# Patient Record
Sex: MALE | Race: BLACK OR AFRICAN AMERICAN | NOT HISPANIC OR LATINO | Employment: OTHER | URBAN - METROPOLITAN AREA
[De-identification: names, ages, dates, MRNs, and addresses within clinical notes are randomized per-mention and may not be internally consistent; named-entity substitution may affect disease eponyms.]

---

## 2017-01-10 ENCOUNTER — TELEPHONE (OUTPATIENT)
Dept: PHYSICAL MEDICINE AND REHAB | Facility: CLINIC | Age: 37
End: 2017-01-10

## 2017-01-10 NOTE — TELEPHONE ENCOUNTER
Left the patient a message letting him know the importance of keeping his appointment tomorrow if he wanted to continue getting his medications and orders from Dr Lyles.. His last visit was 9/28//15.

## 2017-01-11 ENCOUNTER — OFFICE VISIT (OUTPATIENT)
Dept: PHYSICAL MEDICINE AND REHAB | Facility: CLINIC | Age: 37
End: 2017-01-11
Payer: MEDICAID

## 2017-01-11 VITALS
SYSTOLIC BLOOD PRESSURE: 104 MMHG | HEART RATE: 116 BPM | WEIGHT: 150 LBS | BODY MASS INDEX: 19.25 KG/M2 | HEIGHT: 74 IN | DIASTOLIC BLOOD PRESSURE: 71 MMHG

## 2017-01-11 DIAGNOSIS — R25.2 SPASTICITY: Primary | ICD-10-CM

## 2017-01-11 DIAGNOSIS — S24.109S: ICD-10-CM

## 2017-01-11 DIAGNOSIS — K59.2 NEUROGENIC BOWEL: ICD-10-CM

## 2017-01-11 PROBLEM — S24.109A THORACIC SPINAL CORD INJURY: Status: ACTIVE | Noted: 2017-01-11

## 2017-01-11 PROCEDURE — 99213 OFFICE O/P EST LOW 20 MIN: CPT | Mod: PBBFAC,PO | Performed by: PHYSICAL MEDICINE & REHABILITATION

## 2017-01-11 PROCEDURE — 99999 PR PBB SHADOW E&M-EST. PATIENT-LVL III: CPT | Mod: PBBFAC,,, | Performed by: PHYSICAL MEDICINE & REHABILITATION

## 2017-01-11 PROCEDURE — 99214 OFFICE O/P EST MOD 30 MIN: CPT | Mod: S$PBB,,, | Performed by: PHYSICAL MEDICINE & REHABILITATION

## 2017-01-11 RX ORDER — BACLOFEN 20 MG/1
20 TABLET ORAL 4 TIMES DAILY
Qty: 120 TABLET | Refills: 6 | Status: SHIPPED | OUTPATIENT
Start: 2017-01-11 | End: 2017-12-14 | Stop reason: SDUPTHER

## 2017-01-11 RX ORDER — HYDROCODONE BITARTRATE AND ACETAMINOPHEN 10; 325 MG/1; MG/1
1 TABLET ORAL DAILY PRN
Qty: 30 TABLET | Refills: 0 | Status: SHIPPED | OUTPATIENT
Start: 2017-01-11 | End: 2017-01-21

## 2017-01-11 NOTE — MR AVS SNAPSHOT
[facility]  [address]  [facility] [address]  Phone: [phone]                  Gregorio Gaytan   2017 10:00 AM   Office Visit    Description:  Male : 1980   Provider:  Ryan Lyles MD   Department:  [facility]           Diagnoses this Visit        Comments    Spasticity    -  Primary     Neurogenic bowel                To Do List           Goals (5 Years of Data)     None      Follow-Up and Disposition     Return in about 6 months (around 2017) for 40 mins.       These Medications        Disp Refills Start End    baclofen (LIORESAL) 20 MG tablet 120 tablet 6 2017     Take 1 tablet (20 mg total) by mouth 4 (four) times daily. - Oral    Pharmacy: [address] #: [phone]       hydrocodone-acetaminophen 10-325mg (NORCO)  mg Tab 30 tablet 0 2017    Take 1 tablet by mouth daily as needed. - Oral    Pharmacy: [address] #: [phone]         [facility] On [facility]     [facility] On Call Nurse Care Line -  Assistance  Registered nurses in the [facility] provide clinical advisement, health education, appointment booking, and other advisory services.  Call for this free service at [phone].             Medications           Message regarding Medications     Verify the changes and/or additions to your medication regime listed below are the same as discussed with your clinician today.  If any of these changes or additions are incorrect, please notify your healthcare provider.        CHANGE how you are taking these medications     Start Taking Instead of    baclofen (LIORESAL) 20 MG tablet baclofen (LIORESAL) 20 MG tablet    Dosage:  Take 1 tablet (20 mg total) by mouth 4 (four) times daily. Dosage:  Take 20 mg by mouth 4 (four) times daily.    Reason for  "Change:  Reorder            Verify that the below list of medications is an accurate representation of the medications you are currently taking.  If none reported, the list may be blank. If incorrect, please contact your healthcare provider. Carry this list with you in case of emergency.           Current Medications     baclofen (LIORESAL) 20 MG tablet Take 1 tablet (20 mg total) by mouth 4 (four) times daily.    diazePAM (VALIUM) 10 MG Tab TAKE ONE TABLET BY MOUTH THREE TIMES DAILY    food supplemt, lactose-reduced (ENSURE HIGH PROTEIN) Liqd Take 300 mLs by mouth 3 (three) times daily with meals.    gabapentin (NEURONTIN) 800 MG tablet TAKE ONE TABLET BY MOUTH THREE TIMES DAILY    hydrocodone-acetaminophen 10-325mg (NORCO)  mg Tab Take 1 tablet by mouth daily as needed.    scopolamine (TRANSDERM-SCOP) 1.5 mg Place 1 patch (1.5 mg total) onto the skin every 72 hours.    tramadol (ULTRAM) 50 mg tablet TAKE 1 OR 2 TABLETS BY MOUTH THREE TIMES DAILY AS NEEDED           Clinical Reference Information           Vital Signs - Last Recorded  Most recent update: 1/11/2017 11:38 AM by Lisset Sarmiento MA    BP Pulse Ht Wt BMI    104/71 (!) 116 6' 2" (1.88 m) 68 kg (150 lb) 19.26 kg/m2      Blood Pressure          Most Recent Value    BP  104/71      Allergies as of 1/11/2017     Norco [Hydrocodone-acetaminophen]      Immunizations Administered on Date of Encounter - 1/11/2017     None      Orders Placed During Today's Visit      Normal Orders This Visit    Ambulatory Referral to Neurosurgery       [facility] Sign-Up     Activating your [facility] account is as easy as 1-2-3!     1) Visit [email or web address], select Sign Up Now, enter this activation code and your date of birth, then select Next.  [email or web address]  Expires: 2/25/2017 12:21 PM      2) Create a username and password to use when you visit [facility] in the future and select a security question in case you lose your password and select Next.    3) Enter your e-mail " address and click Sign Up!    Additional Information  If you have questions, please e-mail [email or web address] or call [phone] to talk to our [facility] staff. Remember, [facility] is NOT to be used for urgent needs. For medical emergencies, dial 911.         Instructions    1.  Do pressure relief every 20 minutes (3 times per hour).  Your wounds will not heal unless you do this!  2.  Send in the application to the [facility] for bus service to pick you up for your appointments.  3.  I have referred you for a baclofen pump trial.  Please keep this appointment.    4.  Restart your Baclofen.  Start with only 1/2 pill 3 times per day for the first week then increase it to 1 whole pill 3 times per day.  Wait another week then increase it to 1 whole pill 4 times per day.

## 2017-01-11 NOTE — PATIENT INSTRUCTIONS
1.  Do pressure relief every 20 minutes (3 times per hour).  Your wounds will not heal unless you do this!  2.  Send in the application to the [facility] for bus service to pick you up for your appointments.  3.  I have referred you for a baclofen pump trial.  Please keep this appointment.    4.  Restart your Baclofen.  Start with only 1/2 pill 3 times per day for the first week then increase it to 1 whole pill 3 times per day.  Wait another week then increase it to 1 whole pill 4 times per day.
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
## 2017-01-13 ENCOUNTER — TELEPHONE (OUTPATIENT)
Dept: NEUROSURGERY | Facility: CLINIC | Age: 37
End: 2017-01-13

## 2017-01-13 NOTE — TELEPHONE ENCOUNTER
----- Message from Tamra Bradshaw sent at 1/11/2017 12:15 PM CST -----  Contact: lisset(ma for ) [phone]  Lisset is calling to speak with nurse regarding a appt.pls call

## 2017-03-09 ENCOUNTER — TELEPHONE (OUTPATIENT)
Dept: NEUROSURGERY | Facility: CLINIC | Age: 37
End: 2017-03-09

## 2017-03-09 NOTE — TELEPHONE ENCOUNTER
----- Message from Lisset Sarmiento MA sent at 3/6/2017  2:01 PM CST -----  Contact: Lisset/Dr Lyles/[phone]  I spoke to the patient and he is interested in rescheduling the baclofen trial. Said he did not know about the appt.

## 2017-03-24 RX ORDER — TRAMADOL HYDROCHLORIDE 50 MG/1
TABLET ORAL
Qty: 180 TABLET | Refills: 1 | Status: SHIPPED | OUTPATIENT
Start: 2017-03-24 | End: 2017-06-05 | Stop reason: SDUPTHER

## 2017-03-24 RX ORDER — DIAZEPAM 10 MG/1
TABLET ORAL
Qty: 90 TABLET | Refills: 1 | Status: SHIPPED | OUTPATIENT
Start: 2017-03-24 | End: 2017-06-05 | Stop reason: SDUPTHER

## 2017-05-01 RX ORDER — GABAPENTIN 800 MG/1
TABLET ORAL
Qty: 90 TABLET | Refills: 11 | Status: SHIPPED | OUTPATIENT
Start: 2017-05-01 | End: 2017-12-14 | Stop reason: SDUPTHER

## 2017-06-05 RX ORDER — TRAMADOL HYDROCHLORIDE 50 MG/1
TABLET ORAL
Qty: 180 TABLET | Refills: 4 | Status: SHIPPED | OUTPATIENT
Start: 2017-06-05 | End: 2017-12-14 | Stop reason: SDUPTHER

## 2017-06-05 RX ORDER — DIAZEPAM 10 MG/1
TABLET ORAL
Qty: 90 TABLET | Refills: 4 | Status: SHIPPED | OUTPATIENT
Start: 2017-06-05 | End: 2017-12-14 | Stop reason: SDUPTHER

## 2017-09-21 ENCOUNTER — TELEPHONE (OUTPATIENT)
Dept: PHYSICAL MEDICINE AND REHAB | Facility: CLINIC | Age: 37
End: 2017-09-21

## 2017-09-21 NOTE — TELEPHONE ENCOUNTER
----- Message from Waldo Parker sent at 9/21/2017 12:25 PM CDT -----  Contact: Patient @ [phone]  Patient is requesting a return call from Lisset about paperwork for his , dean call

## 2017-12-14 ENCOUNTER — OFFICE VISIT (OUTPATIENT)
Dept: INTERNAL MEDICINE | Facility: CLINIC | Age: 37
End: 2017-12-14
Attending: FAMILY MEDICINE
Payer: MEDICAID

## 2017-12-14 VITALS
HEART RATE: 89 BPM | TEMPERATURE: 98 F | SYSTOLIC BLOOD PRESSURE: 117 MMHG | DIASTOLIC BLOOD PRESSURE: 84 MMHG | OXYGEN SATURATION: 97 %

## 2017-12-14 DIAGNOSIS — S24.103A SPINAL CORD INJURY AT T7-T12 LEVEL: Primary | ICD-10-CM

## 2017-12-14 DIAGNOSIS — Z76.0 MEDICATION REFILL: ICD-10-CM

## 2017-12-14 PROCEDURE — 99999 PR PBB SHADOW E&M-EST. PATIENT-LVL IV: CPT | Mod: PBBFAC,,, | Performed by: STUDENT IN AN ORGANIZED HEALTH CARE EDUCATION/TRAINING PROGRAM

## 2017-12-14 PROCEDURE — 99214 OFFICE O/P EST MOD 30 MIN: CPT | Mod: PBBFAC,PO | Performed by: STUDENT IN AN ORGANIZED HEALTH CARE EDUCATION/TRAINING PROGRAM

## 2017-12-14 PROCEDURE — 99202 OFFICE O/P NEW SF 15 MIN: CPT | Mod: S$PBB,,, | Performed by: STUDENT IN AN ORGANIZED HEALTH CARE EDUCATION/TRAINING PROGRAM

## 2017-12-14 RX ORDER — DIAZEPAM 10 MG/1
10 TABLET ORAL 3 TIMES DAILY
Qty: 60 TABLET | Refills: 0 | Status: SHIPPED | OUTPATIENT
Start: 2017-12-14 | End: 2018-01-08 | Stop reason: SDUPTHER

## 2017-12-14 RX ORDER — BACLOFEN 20 MG/1
20 TABLET ORAL 4 TIMES DAILY
Qty: 60 TABLET | Refills: 0 | Status: SHIPPED | OUTPATIENT
Start: 2017-12-14

## 2017-12-14 RX ORDER — GABAPENTIN 800 MG/1
800 TABLET ORAL 3 TIMES DAILY
Qty: 90 TABLET | Refills: 0 | Status: SHIPPED | OUTPATIENT
Start: 2017-12-14

## 2017-12-14 RX ORDER — TRAMADOL HYDROCHLORIDE 50 MG/1
TABLET ORAL
Qty: 80 TABLET | Refills: 0 | Status: SHIPPED | OUTPATIENT
Start: 2017-12-14 | End: 2018-01-08 | Stop reason: SDUPTHER
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
## 2017-12-18 NOTE — PROGRESS NOTES
Case discussed with resident agree with assessment and plan. Discussed pain management/PM&R follow up, one time refill in clinic of valium/tramadol.

## 2018-01-08 ENCOUNTER — IMMUNIZATION (OUTPATIENT)
Dept: INTERNAL MEDICINE | Facility: CLINIC | Age: 38
End: 2018-01-08
Attending: FAMILY MEDICINE
Payer: MEDICAID

## 2018-01-08 VITALS
TEMPERATURE: 100 F | HEIGHT: 74 IN | HEART RATE: 125 BPM | SYSTOLIC BLOOD PRESSURE: 101 MMHG | OXYGEN SATURATION: 98 % | DIASTOLIC BLOOD PRESSURE: 74 MMHG

## 2018-01-08 DIAGNOSIS — Z76.0 MEDICATION REFILL: ICD-10-CM

## 2018-01-08 DIAGNOSIS — Z23 NEED FOR STREPTOCOCCUS PNEUMONIAE AND INFLUENZA VACCINATION: ICD-10-CM

## 2018-01-08 DIAGNOSIS — L89.40: ICD-10-CM

## 2018-01-08 DIAGNOSIS — S24.103A SPINAL CORD INJURY AT T7-T12 LEVEL: Primary | ICD-10-CM

## 2018-01-08 PROCEDURE — 90471 IMMUNIZATION ADMIN: CPT | Mod: PBBFAC,PO

## 2018-01-08 PROCEDURE — 99213 OFFICE O/P EST LOW 20 MIN: CPT | Mod: PBBFAC,PO,25 | Performed by: INTERNAL MEDICINE

## 2018-01-08 PROCEDURE — 99999 PR PBB SHADOW E&M-EST. PATIENT-LVL III: CPT | Mod: PBBFAC,,, | Performed by: INTERNAL MEDICINE

## 2018-01-08 PROCEDURE — 99214 OFFICE O/P EST MOD 30 MIN: CPT | Mod: S$PBB,,, | Performed by: INTERNAL MEDICINE

## 2018-01-08 RX ORDER — DIAZEPAM 10 MG/1
10 TABLET ORAL 3 TIMES DAILY
Qty: 90 TABLET | Refills: 2 | Status: SHIPPED | OUTPATIENT
Start: 2018-01-08 | End: 2018-04-19 | Stop reason: SDUPTHER

## 2018-01-08 RX ORDER — TRAMADOL HYDROCHLORIDE 50 MG/1
TABLET ORAL
Qty: 80 TABLET | Refills: 2 | Status: SHIPPED | OUTPATIENT
Start: 2018-01-08 | End: 2018-04-19 | Stop reason: SDUPTHER

## 2018-01-08 NOTE — PROGRESS NOTES
Subjective:       Patient ID: Gregorio Gaytan is a 37 y.o. male.    Chief Complaint: Follow-up    Here to [facility].    Dr. Lyles retired.    No new complaints, but has paralysis of spinal cord around T5-6. C/b sacral sores, hip sores, need for suprapubic tube. Lack of bowel control but does have way to promote self defecation.    On anti-spasm meds.    Needs to re-establish care with A.      Review of Systems   Constitutional: Negative for appetite change and unexpected weight change.   Respiratory: Negative for chest tightness and shortness of breath.    Cardiovascular: Negative for chest pain, palpitations and leg swelling.   Gastrointestinal: Negative for abdominal distention, abdominal pain, diarrhea, nausea and vomiting.   Genitourinary: Positive for difficulty urinating. Negative for scrotal swelling and testicular pain.   Musculoskeletal: Positive for gait problem and myalgias. Negative for back pain, neck pain and neck stiffness.   Skin: Positive for wound.        No lesions   Neurological: Positive for weakness. Negative for tremors and syncope.   Hematological: Negative for adenopathy. Does not bruise/bleed easily.       Objective:      Physical Exam   Constitutional: He appears well-developed. No distress.   HENT:   Head: Normocephalic and atraumatic.   Eyes: EOM are normal. Pupils are equal, round, and reactive to light.   Cardiovascular: Normal rate, regular rhythm and normal heart sounds.  Exam reveals no gallop and no friction rub.    No murmur heard.  Pulmonary/Chest: Effort normal and breath sounds normal.   Abdominal: Soft. Bowel sounds are normal. He exhibits no distension. There is no tenderness. There is no rebound.   Musculoskeletal: He exhibits no edema or tenderness.   No midline spine tenderness to deep palpation.  Paralysis in legs. Bandaged sacral decubitus   Skin: He is not diaphoretic.   Psychiatric: He has a normal mood and affect. His behavior is normal.       Assessment:       1.  Need for Streptococcus pneumoniae and influenza vaccination    2. Pressure ulcer of contiguous region involving back, buttock, and hip, unspecified laterality, unspecified ulcer stage    3. Spinal cord injury at T7-T12 level    4. Medication refill        Plan:       Gregorio was seen today for follow-up.    Diagnoses and all orders for this visit:    Need for Streptococcus pneumoniae and influenza vaccination  -     (In Office Administered) Pneumococcal Conjugate Vaccine (13 Valent) (IM)    Pressure ulcer of contiguous region involving back, buttock, and hip, unspecified laterality, unspecified ulcer stage  -     Ambulatory Referral to Physical Medicine Rehab  -     diazePAM (VALIUM) 10 MG Tab; Take 1 tablet (10 mg total) by mouth 3 (three) times daily.  -     traMADol (ULTRAM) 50 mg tablet; TAKE 1 OR 2 TABLETS BY MOUTH THREE TIMES DAILY AS NEEDED  -     Ambulatory referral to Home Health    Spinal cord injury at T7-T12 level  -     Ambulatory Referral to Physical Medicine Rehab  -     diazePAM (VALIUM) 10 MG Tab; Take 1 tablet (10 mg total) by mouth 3 (three) times daily.  -     traMADol (ULTRAM) 50 mg tablet; TAKE 1 OR 2 TABLETS BY MOUTH THREE TIMES DAILY AS NEEDED  -     Ambulatory referral to Home Health    Medication refill    Needs updated blood work, next time.    Confides that he has not used MJ in a few weeks, track use next time.        Health Maintenance       Date Due Completion Date    Lipid Panel 1980 ---    Influenza Vaccine 08/01/2017 ---    TETANUS VACCINE 08/03/2024 8/3/2014          Return in about 3 months (around 4/8/2018).

## 2018-04-19 DIAGNOSIS — L89.40: ICD-10-CM

## 2018-04-19 DIAGNOSIS — S24.103A SPINAL CORD INJURY AT T7-T12 LEVEL: ICD-10-CM

## 2018-04-19 NOTE — TELEPHONE ENCOUNTER
Please call patient and see if he is out of Valium and Tramadol.  If so, I can send in a refill to last him until Dr. Hale returns.

## 2018-04-19 NOTE — LETTER
April 24, 2018    Gregorio Gaytan  [address] [address]             [facility] [address] [place] [address]  Phone: [phone]  Fax: [phone] Dear Gregorio Gaytan,    A request for your refill medication has been received and forwarded to Roel Hale MD.   The refill was approved, however any future refills will require an office visit.  Please call our office at number [phone] to schedule an appointment       If you have any questions or concerns, please don't hesitate to call.    Sincerely,        Nancy Hayward MA

## 2018-04-24 RX ORDER — DIAZEPAM 10 MG/1
10 TABLET ORAL 3 TIMES DAILY
Qty: 90 TABLET | Refills: 0 | Status: SHIPPED | OUTPATIENT
Start: 2018-04-24

## 2018-04-24 RX ORDER — TRAMADOL HYDROCHLORIDE 50 MG/1
TABLET ORAL
Qty: 80 TABLET | Refills: 0 | Status: SHIPPED | OUTPATIENT
Start: 2018-04-24

## 2018-04-24 NOTE — TELEPHONE ENCOUNTER
Hi, please set him up for a followup with me.  prescriptions electronically sent in.    Thank you, Roel Hale